# Patient Record
Sex: MALE | Race: WHITE | Employment: PART TIME | URBAN - METROPOLITAN AREA
[De-identification: names, ages, dates, MRNs, and addresses within clinical notes are randomized per-mention and may not be internally consistent; named-entity substitution may affect disease eponyms.]

---

## 2022-11-21 ENCOUNTER — OFFICE VISIT (OUTPATIENT)
Dept: URGENT CARE | Age: 22
End: 2022-11-21

## 2022-11-21 VITALS
DIASTOLIC BLOOD PRESSURE: 63 MMHG | BODY MASS INDEX: 24.44 KG/M2 | HEIGHT: 69 IN | WEIGHT: 165 LBS | HEART RATE: 79 BPM | RESPIRATION RATE: 20 BRPM | OXYGEN SATURATION: 99 % | SYSTOLIC BLOOD PRESSURE: 142 MMHG | TEMPERATURE: 97.1 F

## 2022-11-21 DIAGNOSIS — J02.9 SORE THROAT: Primary | ICD-10-CM

## 2022-11-21 LAB — S PYO AG THROAT QL: NEGATIVE

## 2022-11-22 NOTE — PROGRESS NOTES
[address] [employer]        NAME: Bette Hughes is a 25 y o  male  : 2000    MRN: [id number]  DATE: 2022  TIME: 9:47 PM    Assessment and Plan   Sore throat [J02 9]  1  Sore throat  POCT rapid strepA    Throat culture            Patient Instructions       Follow up with PCP in 3-5 days  Proceed to  ER if symptoms worsen  Chief Complaint     Chief Complaint   Patient presents with   • Sore Throat   • Generalized Body Aches     Sore throat, swollen lymph noses and body ache since 4 days  History of Present Illness       Patient for evaluation of a sore throat and body aches for the past 4 days  Sore Throat   Pertinent negatives include no congestion, ear discharge, ear pain or trouble swallowing  Generalized Body Aches  Associated symptoms include a sore throat  Pertinent negatives include no congestion, ear discharge, ear pain or rhinorrhea  Review of Systems   Review of Systems   Constitutional: Negative  HENT: Positive for sore throat  Negative for congestion, ear discharge, ear pain, postnasal drip, rhinorrhea, sinus pressure, sinus pain and trouble swallowing  Eyes: Negative  Respiratory: Negative  Cardiovascular: Negative  Gastrointestinal: Negative  Neurological: Negative  Current Medications     No current outpatient medications on file  Current Allergies     Allergies as of 2022 - Reviewed 2022   Allergen Reaction Noted   • Amoxicillin Rash 2022            The following portions of the patient's history were reviewed and updated as appropriate: allergies, current medications, past family history, past medical history, past social history, past surgical history and problem list      History reviewed  No pertinent past medical history  History reviewed  No pertinent surgical history      Family History   Problem Relation Age of Onset   • No Known Problems Mother    • No Known Problems Father          Medications have been verified  Objective   /63   Pulse 79   Temp (!) 97 1 °F (36 2 °C) (Temporal)   Resp 20   Ht 5' 9" (1 753 m)   Wt 74 8 kg (165 lb)   SpO2 99%   BMI 24 37 kg/m²   No LMP for male patient  Physical Exam     Physical Exam  Vitals and nursing note reviewed  Constitutional:       General: He is not in acute distress  Appearance: Normal appearance  He is well-developed  He is not ill-appearing, toxic-appearing or diaphoretic  HENT:      Head: Normocephalic and atraumatic  Right Ear: Tympanic membrane and ear canal normal  No tenderness  No middle ear effusion  Tympanic membrane is not erythematous  Left Ear: Tympanic membrane and ear canal normal  No tenderness  No middle ear effusion  Tympanic membrane is not erythematous  Nose: No congestion or rhinorrhea  Mouth/Throat:      Mouth: Mucous membranes are moist  No oral lesions  Pharynx: Uvula midline  Posterior oropharyngeal erythema present  No pharyngeal swelling, oropharyngeal exudate or uvula swelling  Tonsils: No tonsillar exudate or tonsillar abscesses  1+ on the right  1+ on the left  Eyes:      General:         Right eye: No discharge  Left eye: No discharge  Extraocular Movements: Extraocular movements intact  Conjunctiva/sclera: Conjunctivae normal       Pupils: Pupils are equal, round, and reactive to light  Cardiovascular:      Rate and Rhythm: Normal rate and regular rhythm  Heart sounds: Normal heart sounds  No murmur heard  Pulmonary:      Effort: Pulmonary effort is normal  No respiratory distress  Breath sounds: Normal breath sounds  No stridor  No wheezing, rhonchi or rales  Lymphadenopathy:      Cervical: Cervical adenopathy present  Skin:     General: Skin is warm and dry  Neurological:      General: No focal deficit present  Mental Status: He is alert and oriented to person, place, and time     Psychiatric:         Mood and Affect: Mood normal          Behavior: Behavior normal          Thought Content:  Thought content normal          Judgment: Judgment normal

## 2022-11-23 LAB — BACTERIA THROAT CULT: NORMAL

## 2022-11-27 ENCOUNTER — HOSPITAL ENCOUNTER (EMERGENCY)
Facility: HOSPITAL | Age: 22
Discharge: HOME | End: 2022-11-27
Attending: EMERGENCY MEDICINE

## 2022-11-27 VITALS
SYSTOLIC BLOOD PRESSURE: 133 MMHG | RESPIRATION RATE: 16 BRPM | TEMPERATURE: 98.7 F | DIASTOLIC BLOOD PRESSURE: 65 MMHG | HEART RATE: 85 BPM | OXYGEN SATURATION: 98 %

## 2022-11-27 DIAGNOSIS — K08.89 TOOTH PAIN: Primary | ICD-10-CM

## 2022-11-27 RX ORDER — OXYCODONE HYDROCHLORIDE 5 MG/1
5 TABLET ORAL ONCE
Status: COMPLETED | OUTPATIENT
Start: 2022-11-27 | End: 2022-11-27

## 2022-11-27 RX ORDER — OXYCODONE HYDROCHLORIDE 5 MG/1
5 TABLET ORAL EVERY 6 HOURS PRN
Qty: 8 TABLET | Refills: 0 | Status: SHIPPED | OUTPATIENT
Start: 2022-11-27

## 2022-11-27 RX ADMIN — OXYCODONE HYDROCHLORIDE 5 MG: 5 TABLET ORAL at 12:39

## 2022-11-27 NOTE — DISCHARGE INSTRUCTIONS
Return to the ER if you have any new/worsening symptoms    Return to the emergency department if:   You have trouble breathing or swallowing  You have swelling in your face or neck  Contact your dentist if:   You have a fever and chills  You have trouble opening or closing your mouth  You have swelling around your tooth  You have questions or concerns about your condition or care

## 2022-11-27 NOTE — ED PROVIDER NOTES
History  Chief Complaint   Patient presents with   • Dental Pain     Pt c/o wisdom teeth pain x1 week, worsening over week     Patient is an otherwise healthy 27-year-old male presenting to the emergency department for evaluation of dental pain  Patient states that beginning approximately 4 days ago, the day before Thanksgiving, patient has had slowly increasing dental pain located in the back lower portion of his jaw  He states this is likely attributable to a “impacted “was some tooth  He does state that he has seen dentist previously but does not have current dental follow-up and does not have a current oral surgeon  Denies fever, chills, throat swelling, feeling like his airway is closing all  He states the pain has been so severe that he has not been able to eat much  He has been trying Aleve, ibuprofen, Tylenol around the clock with little improvement  Patient states he is otherwise well without complaint  None       History reviewed  No pertinent past medical history  History reviewed  No pertinent surgical history  Family History   Problem Relation Age of Onset   • No Known Problems Mother    • No Known Problems Father      I have reviewed and agree with the history as documented  E-Cigarette/Vaping     E-Cigarette/Vaping Substances     Social History     Tobacco Use   • Smoking status: Never        Review of Systems   Constitutional: Negative  HENT: Positive for dental problem  Eyes: Negative  Respiratory: Negative  Cardiovascular: Negative  Gastrointestinal: Negative  Endocrine: Negative  Genitourinary: Negative  Musculoskeletal: Negative  Skin: Negative  Allergic/Immunologic: Negative  Neurological: Negative  Hematological: Negative  Psychiatric/Behavioral: Negative  All other systems reviewed and are negative        Physical Exam  ED Triage Vitals   Temperature Pulse Respirations Blood Pressure SpO2   11/27/22 1142 11/27/22 1142 11/27/22 1142 11/27/22 1142 11/27/22 1142   98 7 °F (37 1 °C) 85 16 133/65 98 %      Temp Source Heart Rate Source Patient Position - Orthostatic VS BP Location FiO2 (%)   11/27/22 1142 11/27/22 1142 11/27/22 1142 11/27/22 1142 --   Oral Monitor Sitting Left arm       Pain Score       11/27/22 1239       9             Orthostatic Vital Signs  Vitals:    11/27/22 1142   BP: 133/65   Pulse: 85   Patient Position - Orthostatic VS: Sitting       Physical Exam  Vitals and nursing note reviewed  Constitutional:       General: He is not in acute distress  Appearance: Normal appearance  He is not ill-appearing, toxic-appearing or diaphoretic  HENT:      Head: Normocephalic and atraumatic  Eyes:      General: No scleral icterus  Right eye: No discharge  Left eye: No discharge  Extraocular Movements: Extraocular movements intact  Conjunctiva/sclera: Conjunctivae normal       Pupils: Pupils are equal, round, and reactive to light  Cardiovascular:      Rate and Rhythm: Normal rate  Pulses: Normal pulses  Heart sounds: Normal heart sounds  No murmur heard  No friction rub  No gallop  Pulmonary:      Effort: Pulmonary effort is normal  No respiratory distress  Breath sounds: Normal breath sounds  No stridor  No wheezing, rhonchi or rales  Abdominal:      General: Abdomen is flat  Palpations: Abdomen is soft  Musculoskeletal:         General: No swelling  Normal range of motion  Cervical back: Normal range of motion  No rigidity  Right lower leg: No edema  Left lower leg: No edema  Skin:     General: Skin is warm and dry  Capillary Refill: Capillary refill takes less than 2 seconds  Coloration: Skin is not jaundiced  Findings: No bruising or lesion  Neurological:      General: No focal deficit present  Mental Status: He is alert and oriented to person, place, and time  Mental status is at baseline     Psychiatric:         Mood and Affect: Mood normal          Behavior: Behavior normal          Thought Content: Thought content normal          Judgment: Judgment normal          ED Medications  Medications   oxyCODONE (ROXICODONE) IR tablet 5 mg (5 mg Oral Given 11/27/22 1239)       Diagnostic Studies  Results Reviewed     None                 No orders to display         Procedures  Procedures      ED Course                             SBIRT 20yo+    Flowsheet Row Most Recent Value   SBIRT (25 yo +)    In order to provide better care to our patients, we are screening all of our patients for alcohol and drug use  Would it be okay to ask you these screening questions? Unable to answer at this time Filed at: 11/27/2022 1145                [facility]  Number of Diagnoses or Management Options  Tooth pain: new and does not require workup  Diagnosis management comments: -patient presenting to the emergency department for evaluation of dental pain  -physical exam largely unremarkable  Patient reports tooth is deep in his jaw  No outward side of trouble ordered fluctuated no pre mandibular/pre mental erythema/induration  No jawline swelling  Oral exam within normal limits  -patient has been trying acetaminophen, ibuprofen, Aleve around the clock  We will give her oxycodone here in emergency department  Prescriptions sent for oxycodone so that patient can have analgesia while waiting to be seen by oral surgery    -patient given return precautions, discharged from emergency department       Amount and/or Complexity of Data Reviewed  Decide to obtain previous medical records or to obtain history from someone other than the patient: yes  Review and summarize past medical records: yes  Discuss the patient with other providers: yes  Independent visualization of images, tracings, or specimens: yes        Disposition  Final diagnoses:   Tooth pain     Time reflects when diagnosis was documented in both MDM as applicable and the Disposition within this note Time User Action Codes Description Comment    11/27/2022 12:44 PM Shari Berry Add [R15 44] Tooth pain       ED Disposition     ED Disposition   Discharge    Condition   Stable    Date/Time   Sun Nov 27, 2022 12:44 PM    Comment   Javy Ashford discharge to home/self care  Follow-up Information     Follow up With Specialties Details Why [address] [facility] and [address]  Call in 1 day  [facility]          Discharge Medication List as of 11/27/2022  1:35 PM      START taking these medications    Details   oxyCODONE (Roxicodone) 5 immediate release tablet Take 1 tablet (5 mg total) by mouth every 6 (six) hours as needed for moderate pain Do not drive or drink alcohol while taking medication  Will cause drowsiness  Max Daily Amount: 20 mg, Starting Sun 11/27/2022, Normal           No discharge procedures on file  PDMP Review     None           ED Provider  Attending physically available and evaluated Javy Ashford  I managed the patient along with the ED Attending      Electronically Signed by         Jan Olsen DO  11/27/22 2038

## 2022-11-27 NOTE — ED ATTENDING ATTESTATION
11/27/2022  IGary, , saw and evaluated the patient  I have discussed the patient with the resident/non-physician practitioner and agree with the resident's/non-physician practitioner's findings, Plan of Care, and MDM as documented in the resident's/non-physician practitioner's note, except where noted  All available labs and Radiology studies were reviewed  I was present for key portions of any procedure(s) performed by the resident/non-physician practitioner and I was immediately available to provide assistance  At this point I agree with the current assessment done in the Emergency Department  I have conducted an independent evaluation of this patient a history and physical is as follows:    Patient is a 81-year-old male with no significant past medical history presents with right dental pain  Patient describes pain in the bottom right teeth since around Thanksgiving  Patient has had decreased p o  intake secondary to pain with eating  He did have mild swelling in his right jaw, which has since subsided  Denies pain with swallowing, fever, chills, facial swelling or other concerns  Patient does not have a dentist and has not been able to follow-up regarding his current dental pain  He notes the sting a little bit of blood when brushing his teeth this morning, but otherwise denies any discharge or bleeding  On exam, patient has redundant gum tissue adjacent to the 2nd molar on the right side  No fluctuance or visible abscess  No tonsillar swelling or uvular deviation  No swelling in the submental region  No palpable cervical lymphadenopathy  Patient is well-appearing and does not appear septic  Pain is in the region of the wisdom teeth  No visible infection  Will give pain control and follow-up with [facility]  Portions of the above record have been created with voice recognition software    Occasional wrong word or "sound alike" substitutions may have occurred due to the inherent limitations of voice recognition software  Read the chart carefully and recognize, using context, where substitutions may have occurred        ED Course         Critical Care Time  Procedures

## 2023-01-11 ENCOUNTER — OFFICE VISIT (OUTPATIENT)
Dept: URGENT CARE | Age: 23
End: 2023-01-11

## 2023-01-11 VITALS
SYSTOLIC BLOOD PRESSURE: 120 MMHG | HEART RATE: 97 BPM | OXYGEN SATURATION: 98 % | TEMPERATURE: 98 F | RESPIRATION RATE: 12 BRPM | DIASTOLIC BLOOD PRESSURE: 79 MMHG

## 2023-01-11 DIAGNOSIS — R68.89 FLU-LIKE SYMPTOMS: Primary | ICD-10-CM

## 2023-01-11 NOTE — LETTER
January 11, 2023     Patient: Dory Najera   YOB: 2000   Date of Visit: 1/11/2023       To Whom it May Concern:    Dory Najera was seen in my clinic on 1/11/2023  He may return on 1/13/2023 pending test results  If you have any questions or concerns, please don't hesitate to call           Sincerely,          CORNELIUS Joseph        CC: No Recipients

## 2023-01-12 LAB
FLUAV RNA RESP QL NAA+PROBE: NEGATIVE
FLUBV RNA RESP QL NAA+PROBE: NEGATIVE
SARS-COV-2 RNA RESP QL NAA+PROBE: NEGATIVE

## 2023-01-12 NOTE — PATIENT INSTRUCTIONS
Sure adequate hydration  May alternate Tylenol 650 mg every 4-6 hours with ibuprofen 600 mg every 6-8 hours as needed for fever and body aches  May use over-the-counter decongestants as needed  [facility]flu [facility] pending, [facility]  Follow-up with primary care provider if symptoms do not resolve within 1 to 2 weeks  Seek immediate reevaluation if you have an intractable fever greater than 100 4, neck pain or stiffness, intractable vomiting or diarrhea

## 2023-06-03 ENCOUNTER — HOSPITAL ENCOUNTER (OUTPATIENT)
Dept: RADIOLOGY | Facility: HOSPITAL | Age: 23
Discharge: HOME | End: 2023-06-03
Payer: COMMERCIAL

## 2023-06-03 VITALS
WEIGHT: 195.4 LBS | HEART RATE: 91 BPM | DIASTOLIC BLOOD PRESSURE: 83 MMHG | OXYGEN SATURATION: 98 % | HEIGHT: 69 IN | SYSTOLIC BLOOD PRESSURE: 138 MMHG | BODY MASS INDEX: 28.94 KG/M2

## 2023-06-03 DIAGNOSIS — R20.0 NUMBNESS AND TINGLING IN LEFT HAND: ICD-10-CM

## 2023-06-03 DIAGNOSIS — M79.642 LEFT HAND PAIN: Primary | ICD-10-CM

## 2023-06-03 DIAGNOSIS — R20.2 NUMBNESS AND TINGLING IN LEFT HAND: ICD-10-CM

## 2023-06-03 DIAGNOSIS — M79.642 LEFT HAND PAIN: ICD-10-CM

## 2023-06-03 PROCEDURE — 73130 X-RAY EXAM OF HAND: CPT

## 2023-06-03 NOTE — PROGRESS NOTES
"ASSESSMENT/PLAN:    Assessment:   Left ring and small finger, pain and numbness    Plan:   EMG    Follow Up: After Testing    To Do Next Visit:       General Discussions:           _____________________________________________________  CHIEF COMPLAINT:  Chief Complaint   Patient presents with   • Left Hand - Pain     3 weeks-pain with pressure         SUBJECTIVE:  Sarah Zimmerman is a 25 y o  male who presents with Pain  Mild  Intermittant  Dull of the left hand  This started  3 week(s) ago: Insidious  He recalls a crush injury between boxes in the distant past, but does not know if this is related  Radiation: Yes to the  ring finger and small finger  Previous Treatments: None   Associated symptoms: numbness & tingling  Handedness: right  Work status: [employer]    PAST MEDICAL HISTORY:  History reviewed  No pertinent past medical history  PAST SURGICAL HISTORY:  History reviewed  No pertinent surgical history  FAMILY HISTORY:  Family History   Problem Relation Age of Onset   • No Known Problems Mother    • No Known Problems Father        SOCIAL HISTORY:  Social History     Tobacco Use   • Smoking status: Never   Vaping Use   • Vaping Use: Never used       MEDICATIONS:    Current Outpatient Medications:   •  oxyCODONE (Roxicodone) 5 immediate release tablet, Take 1 tablet (5 mg total) by mouth every 6 (six) hours as needed for moderate pain Do not drive or drink alcohol while taking medication  Will cause drowsiness  Max Daily Amount: 20 mg (Patient not taking: Reported on 6/3/2023), Disp: 8 tablet, Rfl: 0    ALLERGIES:  Allergies   Allergen Reactions   • Amoxicillin Rash       REVIEW OF SYSTEMS:  Pertinent items are noted in HPI  A comprehensive review of systems was negative      LABS:  HgA1c: No results found for: \"HGBA1C\"  BMP: No results found for: \"BUN\", \"CALCIUM\", \"CL\", \"CO2\", \"CREATININE\", \"GLUCOSE\", \"K\", \"NA\"      _____________________________________________________  PHYSICAL EXAMINATION:  Vital " "signs: /83   Pulse 91   Ht 5' 9\" (1 753 m)   Wt 88 6 kg (195 lb 6 4 oz)   SpO2 98%   BMI 28 86 kg/m²   General: well developed and well nourished, alert, oriented times 3 and appears comfortable  Psychiatric: Normal  HEENT: Trachea Midline, No torticollis  Cardiovascular: Regular rate and rhythm  Pulmonary: No audible wheezing   Abdomen: No guarding  Extremities: No lymphedema  Skin: No masses, erythema, lacerations, fluctation, ulcerations  Neurovascular: Sensation Intact to the Median, Ulnar, Radial Nerve, Motor Intact to the Median, Ulnar, Radial Nerve and Pulses Intact    MUSCULOSKELETAL EXAMINATION:  LEFT SIDE:  Carpal tunnel:  No tinels, phalens, weakness, or atrophy and Cubital Tunnel:  No tinels, weakness, or ulnar clawing         _____________________________________________________  STUDIES REVIEWED:  Xray negative for fracture      PROCEDURES PERFORMED:  Procedures  No Procedures performed today      "

## 2023-06-14 NOTE — TELEPHONE ENCOUNTER
Caller: Patient    Doctor: hand specialist    Reason for call:    Patient looking for sooner appt for EMG, he will call around other locations to get sooner appt      Call back#: n/a

## 2023-06-16 NOTE — TELEPHONE ENCOUNTER
LVM for patient requesting him to call back to get him scheduled with Dr Nuria Hurst the week of 07/03

## 2023-07-11 VITALS
HEIGHT: 69 IN | SYSTOLIC BLOOD PRESSURE: 118 MMHG | BODY MASS INDEX: 28.88 KG/M2 | DIASTOLIC BLOOD PRESSURE: 74 MMHG | WEIGHT: 195 LBS | HEART RATE: 71 BPM

## 2023-07-11 DIAGNOSIS — S66.912A STRAIN OF LEFT WRIST, INITIAL ENCOUNTER: Primary | ICD-10-CM

## 2023-07-11 PROCEDURE — 99214 OFFICE O/P EST MOD 30 MIN: CPT | Performed by: STUDENT IN AN ORGANIZED HEALTH CARE EDUCATION/TRAINING PROGRAM

## 2023-07-11 NOTE — PROGRESS NOTES
ORTHOPAEDIC HAND, WRIST, AND ELBOW OFFICE  VISIT       ASSESSMENT/PLAN:      Diagnoses and all orders for this visit:    Strain of left wrist, initial encounter  -     Ambulatory Referral to PT/OT Hand Therapy; Future  -     Cock Up Wrist Splint  -     Diclofenac Sodium (VOLTAREN) 1 %; Apply 2 g topically 4 (four) times a day          25 y.o. male with left wrist strain   Treatment options and expected outcomes were discussed. The patient verbalized understanding of exam findings and treatment plan. The patient was given the opportunity to ask questions. Questions were answered to the patient's satisfaction. The patient decided to move forward with formal therapy and Voltaren Gel via shared decision making. A referral was provided to OT for motion, strengthening and modalities as tolerated. The patient was provided with a cock-up wrist brace he can wear at night. A prescription was also provided to Voltaren Gel. Advised to keep the EMG as scheduled. Follow Up:  6 weeks       To Do Next Visit:  Re-evaluation of current issue may consider MRI is pain is not improved           Adolfo Lo MD  Attending, Orthopaedic Surgery  Hand, Wrist, and Elbow Surgery  [facility]    ____________________________________________________________________________________________________________________________________________      CHIEF COMPLAINT:  Chief Complaint   Patient presents with   • Left Hand - Numbness       SUBJECTIVE:  Patient is a 25 y.o. RHD male who presents today for evaluation and treatment of left hand pain. The patient states this has been ongoing for the past few months. He denies any recent injury or trauma. The patient notes pain to his small and ring fingers in the push up position. He also notes increased pain with . He denies any numbness or tingling. The patient was evaluated by Chaya Finley on 6/3/23 and a EMG was ordered.  The patient has the EMG scheduled for 2/27/24. The patient states he had a work related injury appx 4 years ago when a box crushed his hand. I have personally reviewed all the relevant PMH, PSH, SH, FH, Medications and allergies      PAST MEDICAL HISTORY:  History reviewed. No pertinent past medical history. PAST SURGICAL HISTORY:  History reviewed. No pertinent surgical history. FAMILY HISTORY:  Family History   Problem Relation Age of Onset   • No Known Problems Mother    • No Known Problems Father        SOCIAL HISTORY:  Social History     Tobacco Use   • Smoking status: Never   Vaping Use   • Vaping Use: Never used       MEDICATIONS:    Current Outpatient Medications:   •  Diclofenac Sodium (VOLTAREN) 1 %, Apply 2 g topically 4 (four) times a day, Disp: 100 g, Rfl: 1  •  oxyCODONE (Roxicodone) 5 immediate release tablet, Take 1 tablet (5 mg total) by mouth every 6 (six) hours as needed for moderate pain Do not drive or drink alcohol while taking medication. Will cause drowsiness. Max Daily Amount: 20 mg (Patient not taking: Reported on 6/3/2023), Disp: 8 tablet, Rfl: 0    ALLERGIES:  Allergies   Allergen Reactions   • Amoxicillin Rash           REVIEW OF SYSTEMS:  Musculoskeletal:        As noted in HPI. All other systems reviewed and are negative.     VITALS:  Vitals:    07/11/23 0803   BP: 118/74   Pulse: 71       LABS:  HgA1c: No results found for: "HGBA1C"  BMP: No results found for: "GLUCOSE", "CALCIUM", "NA", "K", "CO2", "CL", "BUN", "CREATININE"    _____________________________________________________  PHYSICAL EXAMINATION:  General: Well developed and well nourished, alert & oriented x 3, appears comfortable  Psychiatric: Normal  HEENT: Normocephalic, Atraumatic Trachea Midline, No torticollis  Pulmonary: No audible wheezing or respiratory distress   Abdomen/GI: Non tender, non distended   Cardiovascular: No pitting edema, 2+ radial pulse   Skin: No masses, erythema, lacerations, fluctation, ulcerations  Neurovascular: Sensation Intact to the Median, Ulnar, Radial Nerve, Motor Intact to the Median, Ulnar, Radial Nerve and Pulses Intact  Musculoskeletal: Normal, except as noted in detailed exam and in HPI. MUSCULOSKELETAL EXAMINATION:  Left hand  TTP A1 pulley ring finger  NTTP TFCC  NTTP ECU  Non tender over fifth metacarpal  Pain with push up position of palm  - tinel's at the elbow  - tinel's at the wrist     ___________________________________________________  STUDIES REVIEWED:  Xrays of the left hand were reviewed and independently interpreted in PACS by Dr. Destinee Deng and demonstrate no osseous abnormalities.          PROCEDURES PERFORMED:  Procedures  No Procedures performed today    _____________________________________________________      Scribe Attestation    I,:  Marina Mon MA am acting as a scribe while in the presence of the attending physician.:       I,:  Sunita Streeter MD personally performed the services described in this documentation    as scribed in my presence.:

## 2023-07-12 ENCOUNTER — HOSPITAL ENCOUNTER (OUTPATIENT)
Dept: NEUROLOGY | Facility: CLINIC | Age: 23
Discharge: HOME | End: 2023-07-12
Payer: COMMERCIAL

## 2023-07-12 DIAGNOSIS — R20.2 NUMBNESS AND TINGLING IN LEFT HAND: ICD-10-CM

## 2023-07-12 DIAGNOSIS — M79.642 LEFT HAND PAIN: ICD-10-CM

## 2023-07-12 DIAGNOSIS — R20.0 NUMBNESS AND TINGLING IN LEFT HAND: ICD-10-CM

## 2023-07-12 PROCEDURE — 95886 MUSC TEST DONE W/N TEST COMP: CPT | Performed by: PSYCHIATRY & NEUROLOGY

## 2023-07-12 PROCEDURE — 95910 NRV CNDJ TEST 7-8 STUDIES: CPT | Performed by: PSYCHIATRY & NEUROLOGY

## 2024-06-26 ENCOUNTER — OFFICE VISIT (OUTPATIENT)
Age: 24
End: 2024-06-26
Payer: COMMERCIAL

## 2024-06-26 VITALS — WEIGHT: 203 LBS | HEIGHT: 69 IN | BODY MASS INDEX: 30.07 KG/M2

## 2024-06-26 DIAGNOSIS — K62.5 RECTAL BLEEDING: Primary | ICD-10-CM

## 2024-06-26 PROCEDURE — 99203 OFFICE O/P NEW LOW 30 MIN: CPT | Performed by: COLON & RECTAL SURGERY

## 2024-06-26 PROCEDURE — 46600 DIAGNOSTIC ANOSCOPY SPX: CPT | Performed by: COLON & RECTAL SURGERY

## 2024-06-26 NOTE — ASSESSMENT & PLAN NOTE
The patient presents for evaluation of rectal bleeding.  He provides photographs of a large amount of blood on toilet tissue after a bowel movement.  The blood is usually bright red but can be dark in color.  It is somewhat mixed with the stool.  My examination reveals internal hemorrhoids and some very superficial, epithelial tearing of the anoderm and perianal skin and small areas that could, theoretically, be the source of the bleeding.  To rule out a more proximal source, I recommend colonoscopy.    To treat the potential hemorrhoidal or anal source of the bleeding, I recommended a high-fiber diet.  A fiber sheet with fiber supplement samples were given and an explanation was included.  The patient will try to avoid wipe trauma.  He can return as needed for worsening of symptoms.  Otherwise I will see him for his colonoscopy.

## 2024-06-26 NOTE — H&P (VIEW-ONLY)
"Ambulatory Visit  Name: Sylvester Gomez      : 2000      MRN: [id number]  Encounter Provider: CHRIS Gonzalez MD  Encounter Date: 2024   Encounter department: [facility] COLON AND RECTAL SURGERY [place]    Assessment & Plan   1. Rectal bleeding  Assessment & Plan:  The patient presents for evaluation of rectal bleeding.  He provides photographs of a large amount of blood on toilet tissue after a bowel movement.  The blood is usually bright red but can be dark in color.  It is somewhat mixed with the stool.  My examination reveals internal hemorrhoids and some very superficial, epithelial tearing of the anoderm and perianal skin and small areas that could, theoretically, be the source of the bleeding.  To rule out a more proximal source, I recommend colonoscopy.    To treat the potential hemorrhoidal or anal source of the bleeding, I recommended a high-fiber diet.  A fiber sheet with fiber supplement samples were given and an explanation was included.  The patient will try to avoid wipe trauma.  He can return as needed for worsening of symptoms.  Otherwise I will see him for his colonoscopy.  Orders:  -     Lower Endoscopy      History of Present Illness     Sylvester Gomez is a 23 y.o. male who presents for evaluation of rectal bleeding and anal lump.     The patient notes bright red rectal bleeding for a couple of months now, with an episode of bleeding in the stool today. He also reports an anal lump he first noticed around a year ago, as well as anal itching and states he usually has 3 bowel movements a day, which vary in consistency.     Review of Systems   Constitutional: Negative.    Respiratory: Negative.     Cardiovascular: Negative.    Gastrointestinal:  Positive for anal bleeding.       Objective     Ht 5' 9\" (1.753 m)   Wt 92.1 kg (203 lb)   BMI 29.98 kg/m²     Physical Exam  Constitutional:       Appearance: Normal appearance.   Cardiovascular:      Rate and Rhythm: Normal rate and " regular rhythm.   Pulmonary:      Effort: Pulmonary effort is normal.      Breath sounds: Normal breath sounds.   Abdominal:      General: Abdomen is flat.      Palpations: Abdomen is soft. There is no mass.      Tenderness: There is no abdominal tenderness. There is no guarding.   Genitourinary:     Comments: There is mild external anal skin scarring, particularly anteriorly, with a small tag and a tiny split in the skin at the anal verge in the right paramedian anterior position.  Internally, the exam is unremarkable.  Anoscopy reveals no specific lesions except for subtle superficial epithelial tearing at the right anterior position.  This may be scope trauma.  Internal hemorrhoids are mild.  They could be a source of bleeding.  The prostate is normal.  Neurological:      General: No focal deficit present.      Mental Status: He is alert and oriented to person, place, and time.     Lower Endoscopy    Date/Time: 6/26/2024 1:00 PM    Performed by: MEGAN Gonzalez MD  Authorized by: MEGAN Gonzalez MD    Verbal consent obtained?: Yes    Consent given by:  Patient  Scope type:  Anoscope   There is mild external anal skin scarring, particularly anteriorly, with a small tag and a tiny split in the skin at the anal verge in the right paramedian anterior position.  Internally, the exam is unremarkable.  Anoscopy reveals no specific lesions except for subtle superficial epithelial tearing at the right anterior position.  This may be scope trauma.  Internal hemorrhoids are mild.  They could be a source of bleeding.  The prostate is normal.      Administrative Statements

## 2024-06-26 NOTE — PROGRESS NOTES
"Ambulatory Visit  Name: Sylvester Gomez      : 2000      MRN: [id number]  Encounter Provider: CHRIS Gonzalez MD  Encounter Date: 2024   Encounter department: [facility] COLON AND RECTAL SURGERY [place]    Assessment & Plan   1. Rectal bleeding  Assessment & Plan:  The patient presents for evaluation of rectal bleeding.  He provides photographs of a large amount of blood on toilet tissue after a bowel movement.  The blood is usually bright red but can be dark in color.  It is somewhat mixed with the stool.  My examination reveals internal hemorrhoids and some very superficial, epithelial tearing of the anoderm and perianal skin and small areas that could, theoretically, be the source of the bleeding.  To rule out a more proximal source, I recommend colonoscopy.    To treat the potential hemorrhoidal or anal source of the bleeding, I recommended a high-fiber diet.  A fiber sheet with fiber supplement samples were given and an explanation was included.  The patient will try to avoid wipe trauma.  He can return as needed for worsening of symptoms.  Otherwise I will see him for his colonoscopy.  Orders:  -     Lower Endoscopy      History of Present Illness     Sylvester Gomez is a 23 y.o. male who presents for evaluation of rectal bleeding and anal lump.     The patient notes bright red rectal bleeding for a couple of months now, with an episode of bleeding in the stool today. He also reports an anal lump he first noticed around a year ago, as well as anal itching and states he usually has 3 bowel movements a day, which vary in consistency.     Review of Systems   Constitutional: Negative.    Respiratory: Negative.     Cardiovascular: Negative.    Gastrointestinal:  Positive for anal bleeding.       Objective     Ht 5' 9\" (1.753 m)   Wt 92.1 kg (203 lb)   BMI 29.98 kg/m²     Physical Exam  Constitutional:       Appearance: Normal appearance.   Cardiovascular:      Rate and Rhythm: Normal rate and " regular rhythm.   Pulmonary:      Effort: Pulmonary effort is normal.      Breath sounds: Normal breath sounds.   Abdominal:      General: Abdomen is flat.      Palpations: Abdomen is soft. There is no mass.      Tenderness: There is no abdominal tenderness. There is no guarding.   Genitourinary:     Comments: There is mild external anal skin scarring, particularly anteriorly, with a small tag and a tiny split in the skin at the anal verge in the right paramedian anterior position.  Internally, the exam is unremarkable.  Anoscopy reveals no specific lesions except for subtle superficial epithelial tearing at the right anterior position.  This may be scope trauma.  Internal hemorrhoids are mild.  They could be a source of bleeding.  The prostate is normal.  Neurological:      General: No focal deficit present.      Mental Status: He is alert and oriented to person, place, and time.     Lower Endoscopy    Date/Time: 6/26/2024 1:00 PM    Performed by: MEGAN Gonzalez MD  Authorized by: MEGAN Gonzalez MD    Verbal consent obtained?: Yes    Consent given by:  Patient  Scope type:  Anoscope   There is mild external anal skin scarring, particularly anteriorly, with a small tag and a tiny split in the skin at the anal verge in the right paramedian anterior position.  Internally, the exam is unremarkable.  Anoscopy reveals no specific lesions except for subtle superficial epithelial tearing at the right anterior position.  This may be scope trauma.  Internal hemorrhoids are mild.  They could be a source of bleeding.  The prostate is normal.      Administrative Statements

## 2024-06-27 ENCOUNTER — TELEPHONE (OUTPATIENT)
Age: 24
End: 2024-06-27

## 2024-06-27 NOTE — TELEPHONE ENCOUNTER
ASC Screening    ASC Screening  BMI > than 45: No  Are you currently pregnant?: No  Do you rely on a wheelchair for mobility?: No  Do you need oxygen during the day?: No  Have you ever been informed by anesthesia that you have a difficult airway?: No  Have you been diagnosed with End Stage Renal Disease (ESRD)?: No  Are you actively on dialysis?: No  Have you been diagnosed with Pulmonary Hypertension?: No  Do you have a pacemaker or an Automatic Implantable Cardioverter Defibrillator (AICD)?: No  Have you ever had an organ transplant?: No  Have you had a stroke, heart attack, myocardial infarction (MI) within the last 6 months?: No  Have you ever been diagnosed with Aortic Stenosis?: No  Have you ever been diagnosed  with Congestive Heart Failure?: No  Have you ever been diagnosed with a heart valve disease?: No  Are you Diabetic?: No  If you are Diabetic, has your A1C been greater than 12 within the last six months?: N/A       Assessment & Plan  1. Rectal bleeding  Assessment & Plan:  The patient presents for evaluation of rectal bleeding.  He provides photographs of a large amount of blood on toilet tissue after a bowel movement.  The blood is usually bright red but can be dark in color.  It is somewhat mixed with the stool.  My examination reveals internal hemorrhoids and some very superficial, epithelial tearing of the anoderm and perianal skin and small areas that could, theoretically, be the source of the bleeding.  To rule out a more proximal source, I recommend colonoscopy.     To treat the potential hemorrhoidal or anal source of the bleeding, I recommended a high-fiber diet.  A fiber sheet with fiber supplement samples were given and an explanation was included.  The patient will try to avoid wipe trauma.  He can return as needed for worsening of symptoms.  Otherwise I will see him for his colonoscopy.  Orders:  -     Lower Endoscopy    Appointment Information   Name: Oscar Gomez MRN: [id number]   Date:  7/10/2024 Status: [facility]   Time: 10:00 AM  10:00 AM Length: 30  30   Visit Type: GI COLONOSCOPY [1102] Copay: $0.00   Provider: MEGAN Gonzalez MD  [facility] GI ROOM 01       Bowel prep reviewed with patient:miralax  Instructions reviewed with patient by:tasha bravo  Clearances: none

## 2024-06-27 NOTE — TELEPHONE ENCOUNTER
Patients GI provider:  Dr. Gonzalez    Number to return call: [phone]    Reason for call: Pt's GF's mom calling to advise insure info. Advised not on med comm consent form, pt will need to call.    Scheduled procedure/appointment date if applicable: N/A

## 2024-06-27 NOTE — TELEPHONE ENCOUNTER
Scheduled in previous encounter       ----- Message from MEGAN Gonzalez MD sent at 6/26/2024  2:17 PM EDT -----  colonoscopy

## 2024-07-03 ENCOUNTER — ANESTHESIA (OUTPATIENT)
Dept: ANESTHESIOLOGY | Facility: HOSPITAL | Age: 24
End: 2024-07-03

## 2024-07-03 ENCOUNTER — ANESTHESIA EVENT (OUTPATIENT)
Dept: ANESTHESIOLOGY | Facility: HOSPITAL | Age: 24
End: 2024-07-03

## 2024-07-09 NOTE — ANESTHESIA PREPROCEDURE EVALUATION
Procedure:  PRE-OP ONLY    Relevant Problems   GI/HEPATIC   (+) Rectal bleeding      NEURO/PSYCH   (+) Numbness and tingling in left hand

## 2024-07-10 ENCOUNTER — HOSPITAL ENCOUNTER (OUTPATIENT)
Dept: GASTROENTEROLOGY | Facility: AMBULARY SURGERY CENTER | Age: 24
Setting detail: OUTPATIENT SURGERY
Discharge: HOME | End: 2024-07-10
Attending: COLON & RECTAL SURGERY | Admitting: COLON & RECTAL SURGERY
Payer: COMMERCIAL

## 2024-07-10 ENCOUNTER — ANESTHESIA (OUTPATIENT)
Dept: GASTROENTEROLOGY | Facility: AMBULARY SURGERY CENTER | Age: 24
End: 2024-07-10

## 2024-07-10 ENCOUNTER — ANESTHESIA EVENT (OUTPATIENT)
Dept: GASTROENTEROLOGY | Facility: AMBULARY SURGERY CENTER | Age: 24
End: 2024-07-10

## 2024-07-10 VITALS
BODY MASS INDEX: 29.47 KG/M2 | DIASTOLIC BLOOD PRESSURE: 85 MMHG | WEIGHT: 199 LBS | HEIGHT: 69 IN | TEMPERATURE: 97 F | OXYGEN SATURATION: 100 % | RESPIRATION RATE: 18 BRPM | HEART RATE: 64 BPM | SYSTOLIC BLOOD PRESSURE: 113 MMHG

## 2024-07-10 DIAGNOSIS — K62.5 RECTAL BLEEDING: ICD-10-CM

## 2024-07-10 PROCEDURE — G0105 COLORECTAL SCRN; HI RISK IND: HCPCS | Performed by: COLON & RECTAL SURGERY

## 2024-07-10 RX ORDER — LIDOCAINE HYDROCHLORIDE 10 MG/ML
INJECTION, SOLUTION EPIDURAL; INFILTRATION; INTRACAUDAL; PERINEURAL AS NEEDED
Status: DISCONTINUED | OUTPATIENT
Start: 2024-07-10 | End: 2024-07-10

## 2024-07-10 RX ORDER — PROPOFOL 10 MG/ML
INJECTION, EMULSION INTRAVENOUS AS NEEDED
Status: DISCONTINUED | OUTPATIENT
Start: 2024-07-10 | End: 2024-07-10

## 2024-07-10 RX ORDER — SODIUM CHLORIDE, SODIUM LACTATE, POTASSIUM CHLORIDE, CALCIUM CHLORIDE 600; 310; 30; 20 MG/100ML; MG/100ML; MG/100ML; MG/100ML
INJECTION, SOLUTION INTRAVENOUS CONTINUOUS PRN
Status: DISCONTINUED | OUTPATIENT
Start: 2024-07-10 | End: 2024-07-10

## 2024-07-10 RX ADMIN — PROPOFOL 150 MG: 10 INJECTION, EMULSION INTRAVENOUS at 10:25

## 2024-07-10 RX ADMIN — SODIUM CHLORIDE, SODIUM LACTATE, POTASSIUM CHLORIDE, AND CALCIUM CHLORIDE: .6; .31; .03; .02 INJECTION, SOLUTION INTRAVENOUS at 10:20

## 2024-07-10 RX ADMIN — LIDOCAINE HYDROCHLORIDE 100 MG: 10 INJECTION, SOLUTION EPIDURAL; INFILTRATION; INTRACAUDAL at 10:25

## 2024-07-10 RX ADMIN — Medication 40 MG: at 10:30

## 2024-07-10 RX ADMIN — PROPOFOL 25 MG: 10 INJECTION, EMULSION INTRAVENOUS at 10:31

## 2024-07-10 RX ADMIN — PROPOFOL 25 MG: 10 INJECTION, EMULSION INTRAVENOUS at 10:36

## 2024-07-10 RX ADMIN — PROPOFOL 25 MG: 10 INJECTION, EMULSION INTRAVENOUS at 10:42

## 2024-07-10 RX ADMIN — PROPOFOL 25 MG: 10 INJECTION, EMULSION INTRAVENOUS at 10:27

## 2024-07-10 RX ADMIN — PROPOFOL 25 MG: 10 INJECTION, EMULSION INTRAVENOUS at 10:32

## 2024-07-10 RX ADMIN — PROPOFOL 25 MG: 10 INJECTION, EMULSION INTRAVENOUS at 10:34

## 2024-07-10 RX ADMIN — PROPOFOL 25 MG: 10 INJECTION, EMULSION INTRAVENOUS at 10:26

## 2024-07-10 RX ADMIN — PROPOFOL 25 MG: 10 INJECTION, EMULSION INTRAVENOUS at 10:38

## 2024-07-10 RX ADMIN — PROPOFOL 25 MG: 10 INJECTION, EMULSION INTRAVENOUS at 10:29

## 2024-07-10 RX ADMIN — PROPOFOL 25 MG: 10 INJECTION, EMULSION INTRAVENOUS at 10:28

## 2024-07-10 RX ADMIN — SODIUM CHLORIDE, SODIUM LACTATE, POTASSIUM CHLORIDE, AND CALCIUM CHLORIDE: .6; .31; .03; .02 INJECTION, SOLUTION INTRAVENOUS at 10:33

## 2024-07-10 RX ADMIN — PROPOFOL 25 MG: 10 INJECTION, EMULSION INTRAVENOUS at 10:40

## 2024-07-10 NOTE — INTERVAL H&P NOTE
H&P reviewed. After examining the patient I find no changes in the patients condition since the H&P had been written.    Vitals:    07/10/24 0923   BP: 122/71   Pulse: 89   Resp: 18   Temp: (!) 97.4 °F (36.3 °C)   SpO2: 98%

## 2024-07-10 NOTE — ANESTHESIA POSTPROCEDURE EVALUATION
Post-Op Assessment Note    CV Status:  Stable    Pain management: adequate       Mental Status:  Sleepy and lethargic   Hydration Status:  Stable   PONV Controlled:  None   Airway Patency:  Patent     Post Op Vitals Reviewed: Yes    No anethesia notable event occurred.    Staff: CRNA               BP (!) 82/52 (07/10/24 1046)    Temp (!) 97 °F (36.1 °C) (07/10/24 1046)    Pulse 67 (07/10/24 1046)   Resp      SpO2 96 % (07/10/24 1046)

## 2024-07-10 NOTE — ANESTHESIA PREPROCEDURE EVALUATION
Procedure:  COLONOSCOPY    Relevant Problems   ANESTHESIA (within normal limits)      CARDIO (within normal limits)      ENDO (within normal limits)      GI/HEPATIC   (+) Rectal bleeding      /RENAL (within normal limits)      GYN (within normal limits)      HEMATOLOGY (within normal limits)      MUSCULOSKELETAL (within normal limits)      NEURO/PSYCH   (+) Numbness and tingling in left hand      PULMONARY (within normal limits)        Physical Exam    Airway    Mallampati score: I  TM Distance: >3 FB  Neck ROM: full     Dental   No notable dental hx     Cardiovascular      Pulmonary      Other Findings        Anesthesia Plan  ASA Score- 1     Anesthesia Type- IV sedation with anesthesia with ASA Monitors.         Additional Monitors:     Airway Plan:            Plan Factors-Exercise tolerance (METS): >4 METS.    Chart reviewed.    Patient summary reviewed.                  Induction- intravenous.    Postoperative Plan-         Informed Consent- Anesthetic plan and risks discussed with patient.  I personally reviewed this patient with the CRNA. Discussed and agreed on the Anesthesia Plan with the CRNA..

## 2025-06-09 ENCOUNTER — APPOINTMENT (EMERGENCY)
Dept: CT IMAGING | Facility: HOSPITAL | Age: 25
End: 2025-06-09
Payer: COMMERCIAL

## 2025-06-09 ENCOUNTER — RESULTS FOLLOW-UP (OUTPATIENT)
Dept: EMERGENCY DEPT | Facility: HOSPITAL | Age: 25
End: 2025-06-09

## 2025-06-09 ENCOUNTER — APPOINTMENT (EMERGENCY)
Dept: RADIOLOGY | Facility: HOSPITAL | Age: 25
End: 2025-06-09
Payer: COMMERCIAL

## 2025-06-09 ENCOUNTER — HOSPITAL ENCOUNTER (EMERGENCY)
Facility: HOSPITAL | Age: 25
Discharge: HOME | End: 2025-06-09
Attending: SURGERY
Payer: COMMERCIAL

## 2025-06-09 ENCOUNTER — APPOINTMENT (OUTPATIENT)
Dept: RADIOLOGY | Facility: HOSPITAL | Age: 25
End: 2025-06-09
Payer: COMMERCIAL

## 2025-06-09 VITALS
OXYGEN SATURATION: 95 % | DIASTOLIC BLOOD PRESSURE: 69 MMHG | HEART RATE: 79 BPM | SYSTOLIC BLOOD PRESSURE: 125 MMHG | RESPIRATION RATE: 18 BRPM | TEMPERATURE: 98.4 F | WEIGHT: 214.07 LBS
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
## 2025-06-09 DIAGNOSIS — S27.321A CONTUSION OF RIGHT LUNG, INITIAL ENCOUNTER: ICD-10-CM

## 2025-06-09 LAB
BASE EXCESS BLDA CALC-SCNC: 1 MMOL/L (ref -2–3)
CA-I BLD-SCNC: 1.2 MMOL/L (ref 1.12–1.32)
GLUCOSE SERPL-MCNC: 110 MG/DL (ref 65–140)
HCO3 BLDA-SCNC: 26.8 MMOL/L (ref 24–30)
HCT VFR BLD CALC: 44 % (ref 36.5–49.3)
HGB BLDA-MCNC: 15 G/DL (ref 12–17)
PCO2 BLD: 28 MMOL/L (ref 21–32)
PCO2 BLD: 43.7 MM HG (ref 42–50)
PH BLD: 7.39 [PH] (ref 7.3–7.4)
PO2 BLD: 33 MM HG (ref 35–45)
POTASSIUM BLD-SCNC: 3.2 MMOL/L (ref 3.5–5.3)
SAO2 % BLD FROM PO2: 63 % (ref 60–85)
SODIUM BLD-SCNC: 143 MMOL/L (ref 136–145)
SPECIMEN SOURCE: ABNORMAL

## 2025-06-09 PROCEDURE — 74177 CT ABD & PELVIS W/CONTRAST: CPT

## 2025-06-09 PROCEDURE — 90715 TDAP VACCINE 7 YRS/> IM: CPT

## 2025-06-09 PROCEDURE — EDAIR PR ED AIR: Performed by: EMERGENCY MEDICINE

## 2025-06-09 PROCEDURE — 96374 THER/PROPH/DIAG INJ IV PUSH: CPT

## 2025-06-09 PROCEDURE — 71260 CT THORAX DX C+: CPT

## 2025-06-09 PROCEDURE — 85014 HEMATOCRIT: CPT

## 2025-06-09 PROCEDURE — 84295 ASSAY OF SERUM SODIUM: CPT

## 2025-06-09 PROCEDURE — 82947 ASSAY GLUCOSE BLOOD QUANT: CPT

## 2025-06-09 PROCEDURE — 82803 BLOOD GASES ANY COMBINATION: CPT

## 2025-06-09 PROCEDURE — 76705 ECHO EXAM OF ABDOMEN: CPT

## 2025-06-09 PROCEDURE — 82330 ASSAY OF CALCIUM: CPT

## 2025-06-09 PROCEDURE — 99284 EMERGENCY DEPT VISIT MOD MDM: CPT

## 2025-06-09 PROCEDURE — 90471 IMMUNIZATION ADMIN: CPT

## 2025-06-09 PROCEDURE — 73110 X-RAY EXAM OF WRIST: CPT

## 2025-06-09 PROCEDURE — 71045 X-RAY EXAM CHEST 1 VIEW: CPT

## 2025-06-09 PROCEDURE — 70450 CT HEAD/BRAIN W/O DYE: CPT

## 2025-06-09 PROCEDURE — 72125 CT NECK SPINE W/O DYE: CPT

## 2025-06-09 PROCEDURE — 84132 ASSAY OF SERUM POTASSIUM: CPT

## 2025-06-09 PROCEDURE — 93308 TTE F-UP OR LMTD: CPT

## 2025-06-09 PROCEDURE — 99204 OFFICE O/P NEW MOD 45 MIN: CPT | Performed by: SURGERY

## 2025-06-09 RX ORDER — ACETAMINOPHEN 10 MG/ML
1000 INJECTION, SOLUTION INTRAVENOUS ONCE
Status: COMPLETED | OUTPATIENT
Start: 2025-06-09 | End: 2025-06-09

## 2025-06-09 RX ADMIN — TETANUS TOXOID, REDUCED DIPHTHERIA TOXOID AND ACELLULAR PERTUSSIS VACCINE, ADSORBED 0.5 ML: 5; 2.5; 8; 8; 2.5 SUSPENSION INTRAMUSCULAR at 04:18

## 2025-06-09 RX ADMIN — IOHEXOL 100 ML: 350 INJECTION, SOLUTION INTRAVENOUS at 03:58

## 2025-06-09 RX ADMIN — ACETAMINOPHEN 1000 MG: 10 INJECTION INTRAVENOUS at 05:32
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
## 2025-06-09 NOTE — DISCHARGE INSTRUCTIONS
Trauma Discharge Instructions:    Please follow-up as instructed. If you need a follow-up appointment, please call the office when you leave to schedule an appointment.    Activity:  - PT and OT evaluation and treatment as indicated.  - You may resume activity as tolerated.  - Walking and normal light activities are encouraged.  - Normal daily activities including climbing steps are okay.    Return to work:    - You may return to work once cleared by the Trauma Service.    Diet:    - You may resume your normal diet.    Medications:  - You should continue your current medication regimen after discharge unless otherwise instructed. Please refer to your discharge medication list for further details.  - You should take Tylenol as needed for pain.    Additional Instructions:  - Please obtain a chest x-ray 1 to 2 days prior to your clinic appointment.  - If you have any questions or concerns after discharge please call the office.  - Call office or return to ER if fever greater than 101, chills, persistent nausea/vomiting, worsening/uncontrollable pain, develop productive cough, increasing shortness of breath, difficulty breathing, and/or increasing redness or purulent/foul smelling drainage from incision(s).

## 2025-06-09 NOTE — INCIDENTAL FINDINGS
The following findings require follow up:  Radiographic finding   Finding: The right kidney is absent. The solitary left kidney is normal in appearance.      Follow up should be done within 1 month(s)    Please notify the following clinician to assist with the follow up:   Primary Care Physician    Incidental finding results were discussed with the Patient by Regina Lo PA-C on 06/09/25.   They expressed understanding and all questions answered.

## 2025-06-13 ENCOUNTER — HOSPITAL ENCOUNTER (OUTPATIENT)
Dept: RADIOLOGY | Facility: HOSPITAL | Age: 25
End: 2025-06-13
Payer: COMMERCIAL

## 2025-06-13 DIAGNOSIS — S27.321A CONTUSION OF RIGHT LUNG, INITIAL ENCOUNTER: ICD-10-CM

## 2025-06-13 PROCEDURE — 71046 X-RAY EXAM CHEST 2 VIEWS: CPT

## 2025-06-17 ENCOUNTER — OFFICE VISIT (OUTPATIENT)
Dept: SURGERY | Facility: CLINIC | Age: 25
End: 2025-06-17

## 2025-06-17 VITALS
WEIGHT: 213.8 LBS | OXYGEN SATURATION: 97 % | DIASTOLIC BLOOD PRESSURE: 82 MMHG | HEIGHT: 69 IN | BODY MASS INDEX: 31.67 KG/M2 | SYSTOLIC BLOOD PRESSURE: 122 MMHG | HEART RATE: 94 BPM | TEMPERATURE: 98 F

## 2025-06-17 DIAGNOSIS — M79.643 HAND PAIN: Primary | ICD-10-CM

## 2025-06-17 DIAGNOSIS — M25.539 WRIST PAIN: ICD-10-CM

## 2025-06-17 DIAGNOSIS — S06.0XAA CONCUSSION: ICD-10-CM

## 2025-06-19 PROBLEM — S66.911A MUSCLE STRAIN OF RIGHT WRIST: Status: ACTIVE | Noted: 2025-06-19

## 2025-06-24 ENCOUNTER — HOSPITAL ENCOUNTER (OUTPATIENT)
Dept: RADIOLOGY | Facility: HOSPITAL | Age: 25
Discharge: HOME | End: 2025-06-24
Attending: PHYSICIAN ASSISTANT
Payer: COMMERCIAL

## 2025-06-24 DIAGNOSIS — M25.539 WRIST PAIN: ICD-10-CM

## 2025-06-24 DIAGNOSIS — M79.643 HAND PAIN: ICD-10-CM

## 2025-06-24 PROCEDURE — 73130 X-RAY EXAM OF HAND: CPT

## 2025-06-24 PROCEDURE — 73110 X-RAY EXAM OF WRIST: CPT

## 2025-07-03 ENCOUNTER — OFFICE VISIT (OUTPATIENT)
Dept: SURGERY | Facility: CLINIC | Age: 25
End: 2025-07-03
Payer: COMMERCIAL

## 2025-07-03 VITALS
SYSTOLIC BLOOD PRESSURE: 138 MMHG | HEART RATE: 85 BPM | OXYGEN SATURATION: 98 % | BODY MASS INDEX: 31.66 KG/M2 | DIASTOLIC BLOOD PRESSURE: 85 MMHG | TEMPERATURE: 98.9 F | WEIGHT: 214.4 LBS

## 2025-07-03 DIAGNOSIS — S62.102A CLOSED FRACTURE OF LEFT WRIST: ICD-10-CM

## 2025-07-03 DIAGNOSIS — S06.0X0D CONCUSSION WITHOUT LOSS OF CONSCIOUSNESS, SUBSEQUENT ENCOUNTER: ICD-10-CM

## 2025-07-03 DIAGNOSIS — S62.102A LEFT WRIST FRACTURE: Primary | ICD-10-CM

## 2025-07-03 PROBLEM — S06.0X0A CONCUSSION WITHOUT LOSS OF CONSCIOUSNESS: Status: ACTIVE | Noted: 2025-07-03

## 2025-07-03 PROCEDURE — 99214 OFFICE O/P EST MOD 30 MIN: CPT
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
## 2025-07-03 NOTE — LETTER
July 3, 2025     Patient: Sylvester Gomez  YOB: 2000  Date of Visit: 7/3/2025      To Whom it May Concern:    Sylvester Gomez is under my professional care. Sylvester was seen in my office on 7/3/2025. Sylvester may not return to work until he is seen and cleared by orthopedic hand surgery.    If you have any questions or concerns, please don't hesitate to call.         Sincerely,          Trauma Surgery provider        CC: No Recipients
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
## 2025-07-14 ENCOUNTER — OFFICE VISIT (OUTPATIENT)
Dept: OBGYN CLINIC | Facility: CLINIC | Age: 25
End: 2025-07-14
Payer: COMMERCIAL

## 2025-07-14 ENCOUNTER — HOSPITAL ENCOUNTER (OUTPATIENT)
Dept: RADIOLOGY | Facility: HOSPITAL | Age: 25
Discharge: HOME | End: 2025-07-14
Attending: ORTHOPAEDIC SURGERY
Payer: COMMERCIAL

## 2025-07-14 VITALS — WEIGHT: 213 LBS | HEIGHT: 69 IN | BODY MASS INDEX: 31.55 KG/M2

## 2025-07-14 DIAGNOSIS — S62.102A LEFT WRIST FRACTURE: ICD-10-CM

## 2025-07-14 DIAGNOSIS — S62.102A LEFT WRIST FRACTURE: Primary | ICD-10-CM

## 2025-07-14 PROCEDURE — 73110 X-RAY EXAM OF WRIST: CPT

## 2025-07-14 PROCEDURE — 99244 OFF/OP CNSLTJ NEW/EST MOD 40: CPT | Performed by: ORTHOPAEDIC SURGERY

## 2025-07-14 PROCEDURE — 25650 CLTX ULNAR STYLOID FRACTURE: CPT | Performed by: ORTHOPAEDIC SURGERY
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
## 2025-07-14 NOTE — LETTER
July 14, 2025     Patient: Sylvester Gomez  YOB: 2000  Date of Visit: 7/14/2025      To Whom it May Concern:    Sylvester Gomez is under my professional care. Sylvester was seen in my office on 7/14/2025. Sylvester is unable to return back to work until cleared by my office.     If you have any questions or concerns, please don't hesitate to call.         Sincerely,          Ivanna Aly DO        CC: No Recipients

## 2025-07-23 ENCOUNTER — EVALUATION (OUTPATIENT)
Dept: OCCUPATIONAL THERAPY | Facility: CLINIC | Age: 25
End: 2025-07-23
Attending: ORTHOPAEDIC SURGERY
Payer: COMMERCIAL

## 2025-07-23 DIAGNOSIS — S62.102D LEFT WRIST FRACTURE, WITH ROUTINE HEALING, SUBSEQUENT ENCOUNTER: Primary | ICD-10-CM

## 2025-07-23 DIAGNOSIS — S62.102A LEFT WRIST FRACTURE: ICD-10-CM

## 2025-07-23 PROCEDURE — 97165 OT EVAL LOW COMPLEX 30 MIN: CPT

## 2025-07-23 PROCEDURE — 97110 THERAPEUTIC EXERCISES: CPT
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
## 2025-07-24 ENCOUNTER — TELEPHONE (OUTPATIENT)
Dept: SURGERY | Facility: CLINIC | Age: 25
End: 2025-07-24

## 2025-07-25 ENCOUNTER — OFFICE VISIT (OUTPATIENT)
Dept: OCCUPATIONAL THERAPY | Facility: CLINIC | Age: 25
End: 2025-07-25
Payer: COMMERCIAL

## 2025-07-25 DIAGNOSIS — S62.102D LEFT WRIST FRACTURE, WITH ROUTINE HEALING, SUBSEQUENT ENCOUNTER: Primary | ICD-10-CM

## 2025-07-25 PROCEDURE — 97140 MANUAL THERAPY 1/> REGIONS: CPT

## 2025-07-25 PROCEDURE — 97110 THERAPEUTIC EXERCISES: CPT

## 2025-07-25 NOTE — PROGRESS NOTES
Daily Note     Today's date: 2025  Patient name: Sylvester Gomez  : 2000  MRN: [id number]  Referring provider: Ivanna Aly DO  Dx:   Encounter Diagnosis     ICD-10-CM    1. Left wrist fracture, with routine healing, subsequent encounter  S62.102D                      Subjective: I have a little pain at the end of the bending      Objective: See treatment diary below      Assessment: Tolerated treatment well. Patient has full wrist AROM, but it is painful at end range.       Plan: Continue per plan of care.  Progress treatment as tolerated.         Insurance Billing Rule ReEval POC expires Auth Status Total   Visits  Start date  Expiration date PT/OT + Visit Limit? Co-Insurance Misc   [employer] CMS   N/A 75 25 N/A 75 Yes 15%    Auto    Pending                                                    Visit/Unit Tracking  AUTH Status: N/A Date                    Visits  Authed: 75 Used 1 1                    Remaining  74 73                           Precautions: Ulnar Fracture  DOI: 25      Manuals            STM  8'                                                  Neuro Re-Ed                                                                                                        Ther Ex             HEP Wrist AROM    Forearm AROM            Wrist AROM  x30           Forearm AROM             Med ball rolls  3'           Sup/pro roller  3'           Wrist Maze  x5           Tb on wall  x5           Wrist AROM on wedge  X30 F/E w/ dowel                                                  Ther Activity             Keypegs  x1                                                               Modalities             MHP  5'

## 2025-07-29 ENCOUNTER — OFFICE VISIT (OUTPATIENT)
Dept: OCCUPATIONAL THERAPY | Facility: CLINIC | Age: 25
End: 2025-07-29
Payer: COMMERCIAL

## 2025-07-29 DIAGNOSIS — S62.102D LEFT WRIST FRACTURE, WITH ROUTINE HEALING, SUBSEQUENT ENCOUNTER: Primary | ICD-10-CM

## 2025-07-29 PROCEDURE — 97140 MANUAL THERAPY 1/> REGIONS: CPT

## 2025-07-29 PROCEDURE — 97110 THERAPEUTIC EXERCISES: CPT

## 2025-08-04 ENCOUNTER — TELEPHONE (OUTPATIENT)
Age: 25
End: 2025-08-04

## 2025-08-04 ENCOUNTER — OFFICE VISIT (OUTPATIENT)
Dept: OBGYN CLINIC | Facility: CLINIC | Age: 25
End: 2025-08-04

## 2025-08-04 ENCOUNTER — HOSPITAL ENCOUNTER (OUTPATIENT)
Dept: RADIOLOGY | Facility: HOSPITAL | Age: 25
Discharge: HOME | End: 2025-08-04
Attending: ORTHOPAEDIC SURGERY
Payer: COMMERCIAL

## 2025-08-04 ENCOUNTER — OFFICE VISIT (OUTPATIENT)
Dept: OCCUPATIONAL THERAPY | Facility: CLINIC | Age: 25
End: 2025-08-04
Payer: COMMERCIAL

## 2025-08-04 VITALS — HEIGHT: 69 IN | BODY MASS INDEX: 31.99 KG/M2 | WEIGHT: 216 LBS

## 2025-08-04 DIAGNOSIS — M25.532 PAIN IN LEFT WRIST: ICD-10-CM

## 2025-08-04 DIAGNOSIS — S62.102D CLOSED FRACTURE OF LEFT WRIST WITH ROUTINE HEALING, SUBSEQUENT ENCOUNTER: ICD-10-CM

## 2025-08-04 DIAGNOSIS — S62.102D LEFT WRIST FRACTURE, WITH ROUTINE HEALING, SUBSEQUENT ENCOUNTER: Primary | ICD-10-CM

## 2025-08-04 DIAGNOSIS — S62.102A CLOSED FRACTURE OF LEFT WRIST, INITIAL ENCOUNTER: Primary | ICD-10-CM

## 2025-08-04 PROCEDURE — 73110 X-RAY EXAM OF WRIST: CPT

## 2025-08-04 PROCEDURE — 99024 POSTOP FOLLOW-UP VISIT: CPT | Performed by: ORTHOPAEDIC SURGERY

## 2025-08-04 PROCEDURE — 97110 THERAPEUTIC EXERCISES: CPT | Performed by: OCCUPATIONAL THERAPIST

## 2025-08-07 ENCOUNTER — OFFICE VISIT (OUTPATIENT)
Dept: OCCUPATIONAL THERAPY | Facility: CLINIC | Age: 25
End: 2025-08-07
Payer: COMMERCIAL

## 2025-08-07 DIAGNOSIS — S62.102D LEFT WRIST FRACTURE, WITH ROUTINE HEALING, SUBSEQUENT ENCOUNTER: ICD-10-CM

## 2025-08-07 DIAGNOSIS — S62.102D CLOSED FRACTURE OF LEFT WRIST WITH ROUTINE HEALING, SUBSEQUENT ENCOUNTER: Primary | ICD-10-CM

## 2025-08-07 PROCEDURE — 97530 THERAPEUTIC ACTIVITIES: CPT

## 2025-08-07 PROCEDURE — 97140 MANUAL THERAPY 1/> REGIONS: CPT

## 2025-08-07 PROCEDURE — 97110 THERAPEUTIC EXERCISES: CPT

## 2025-08-11 ENCOUNTER — OFFICE VISIT (OUTPATIENT)
Dept: OCCUPATIONAL THERAPY | Facility: CLINIC | Age: 25
End: 2025-08-11
Payer: COMMERCIAL